# Patient Record
Sex: MALE | Race: WHITE | NOT HISPANIC OR LATINO | URBAN - METROPOLITAN AREA
[De-identification: names, ages, dates, MRNs, and addresses within clinical notes are randomized per-mention and may not be internally consistent; named-entity substitution may affect disease eponyms.]

---

## 2017-08-02 ENCOUNTER — OUTPATIENT (OUTPATIENT)
Dept: OUTPATIENT SERVICES | Facility: HOSPITAL | Age: 82
LOS: 1 days | End: 2017-08-02
Payer: MEDICARE

## 2017-08-02 DIAGNOSIS — Z51.89 ENCOUNTER FOR OTHER SPECIFIED AFTERCARE: ICD-10-CM

## 2017-08-02 DIAGNOSIS — R26.1 PARALYTIC GAIT: ICD-10-CM

## 2017-08-02 DIAGNOSIS — R47.01 APHASIA: ICD-10-CM

## 2017-08-02 DIAGNOSIS — I63.9 CEREBRAL INFARCTION, UNSPECIFIED: ICD-10-CM

## 2017-09-05 ENCOUNTER — APPOINTMENT (OUTPATIENT)
Dept: NEUROLOGY | Facility: CLINIC | Age: 82
End: 2017-09-05
Payer: MEDICARE

## 2017-09-05 VITALS
DIASTOLIC BLOOD PRESSURE: 93 MMHG | HEART RATE: 70 BPM | SYSTOLIC BLOOD PRESSURE: 169 MMHG | HEIGHT: 66 IN | WEIGHT: 129 LBS | BODY MASS INDEX: 20.73 KG/M2

## 2017-09-05 DIAGNOSIS — I10 ESSENTIAL (PRIMARY) HYPERTENSION: ICD-10-CM

## 2017-09-05 DIAGNOSIS — Z87.891 PERSONAL HISTORY OF NICOTINE DEPENDENCE: ICD-10-CM

## 2017-09-05 PROCEDURE — 99215 OFFICE O/P EST HI 40 MIN: CPT

## 2017-09-05 RX ORDER — CLOPIDOGREL 75 MG/1
75 TABLET, FILM COATED ORAL
Refills: 0 | Status: ACTIVE | COMMUNITY

## 2017-09-05 RX ORDER — ERGOCALCIFEROL 1.25 MG/1
1.25 MG CAPSULE, LIQUID FILLED ORAL
Refills: 0 | Status: ACTIVE | COMMUNITY

## 2017-09-05 RX ORDER — ASCORBIC ACID 125 MG
TABLET,CHEWABLE ORAL
Refills: 0 | Status: ACTIVE | COMMUNITY

## 2017-09-05 RX ORDER — LOSARTAN POTASSIUM 100 MG/1
TABLET, FILM COATED ORAL
Refills: 0 | Status: ACTIVE | COMMUNITY

## 2017-11-15 PROCEDURE — 97750 PHYSICAL PERFORMANCE TEST: CPT

## 2017-11-15 PROCEDURE — G8978: CPT | Mod: CM

## 2017-11-15 PROCEDURE — 92507 TX SP LANG VOICE COMM INDIV: CPT

## 2017-11-15 PROCEDURE — 97163 PT EVAL HIGH COMPLEX 45 MIN: CPT

## 2017-11-15 PROCEDURE — G8983: CPT | Mod: CL

## 2017-11-15 PROCEDURE — G9163: CPT | Mod: CK

## 2017-11-15 PROCEDURE — G8980: CPT | Mod: CL

## 2017-11-15 PROCEDURE — G8979: CPT | Mod: CL

## 2017-11-15 PROCEDURE — 97530 THERAPEUTIC ACTIVITIES: CPT

## 2017-11-15 PROCEDURE — G8981: CPT | Mod: CL

## 2017-11-15 PROCEDURE — 97535 SELF CARE MNGMENT TRAINING: CPT

## 2017-11-15 PROCEDURE — G9164: CPT | Mod: CL

## 2017-11-15 PROCEDURE — G8982: CPT | Mod: CL

## 2017-11-15 PROCEDURE — 97110 THERAPEUTIC EXERCISES: CPT

## 2017-11-15 PROCEDURE — 97112 NEUROMUSCULAR REEDUCATION: CPT

## 2017-11-15 PROCEDURE — 97167 OT EVAL HIGH COMPLEX 60 MIN: CPT

## 2017-11-15 PROCEDURE — G9162: CPT | Mod: CL

## 2017-11-15 PROCEDURE — 97116 GAIT TRAINING THERAPY: CPT | Mod: KX

## 2017-11-15 PROCEDURE — 92523 SPEECH SOUND LANG COMPREHEN: CPT

## 2017-12-27 ENCOUNTER — APPOINTMENT (OUTPATIENT)
Dept: NEUROLOGY | Facility: CLINIC | Age: 82
End: 2017-12-27
Payer: MEDICARE

## 2017-12-27 VITALS — DIASTOLIC BLOOD PRESSURE: 62 MMHG | SYSTOLIC BLOOD PRESSURE: 104 MMHG | HEIGHT: 66 IN

## 2017-12-27 PROCEDURE — 99215 OFFICE O/P EST HI 40 MIN: CPT

## 2018-04-04 ENCOUNTER — APPOINTMENT (OUTPATIENT)
Dept: NEUROLOGY | Facility: CLINIC | Age: 83
End: 2018-04-04
Payer: MEDICARE

## 2018-04-04 VITALS
WEIGHT: 129 LBS | SYSTOLIC BLOOD PRESSURE: 140 MMHG | BODY MASS INDEX: 20.73 KG/M2 | DIASTOLIC BLOOD PRESSURE: 68 MMHG | HEIGHT: 66 IN

## 2018-04-04 PROCEDURE — 99214 OFFICE O/P EST MOD 30 MIN: CPT

## 2018-08-30 ENCOUNTER — APPOINTMENT (OUTPATIENT)
Dept: NEUROLOGY | Facility: CLINIC | Age: 83
End: 2018-08-30
Payer: MEDICARE

## 2018-08-30 VITALS
HEIGHT: 66 IN | DIASTOLIC BLOOD PRESSURE: 90 MMHG | SYSTOLIC BLOOD PRESSURE: 190 MMHG | BODY MASS INDEX: 20.89 KG/M2 | WEIGHT: 130 LBS

## 2018-08-30 DIAGNOSIS — I63.9 CEREBRAL INFARCTION, UNSPECIFIED: ICD-10-CM

## 2018-08-30 DIAGNOSIS — I25.10 ATHEROSCLEROTIC HEART DISEASE OF NATIVE CORONARY ARTERY W/OUT ANGINA PECTORIS: ICD-10-CM

## 2018-08-30 PROCEDURE — 99214 OFFICE O/P EST MOD 30 MIN: CPT

## 2019-04-29 ENCOUNTER — INPATIENT (INPATIENT)
Facility: HOSPITAL | Age: 84
LOS: 1 days | Discharge: HOME | DRG: 299 | End: 2019-05-01
Attending: HOSPITALIST | Admitting: HOSPITALIST
Payer: COMMERCIAL

## 2019-04-29 VITALS
OXYGEN SATURATION: 99 % | WEIGHT: 130.07 LBS | HEART RATE: 96 BPM | HEIGHT: 69 IN | TEMPERATURE: 98 F | SYSTOLIC BLOOD PRESSURE: 131 MMHG | DIASTOLIC BLOOD PRESSURE: 72 MMHG | RESPIRATION RATE: 15 BRPM

## 2019-04-29 LAB
APTT BLD: 32.6 SEC — SIGNIFICANT CHANGE UP (ref 27.5–36.3)
BASOPHILS # BLD AUTO: 0 K/UL — SIGNIFICANT CHANGE UP (ref 0–0.2)
BASOPHILS NFR BLD AUTO: 0.3 % — SIGNIFICANT CHANGE UP (ref 0–2)
EOSINOPHIL # BLD AUTO: 0.1 K/UL — SIGNIFICANT CHANGE UP (ref 0–0.5)
EOSINOPHIL NFR BLD AUTO: 1.5 % — SIGNIFICANT CHANGE UP (ref 0–5)
HCT VFR BLD CALC: 31.9 % — LOW (ref 42–52)
HGB BLD-MCNC: 10.3 G/DL — LOW (ref 14–18)
INR BLD: 1.16 RATIO — SIGNIFICANT CHANGE UP (ref 0.88–1.16)
LACTATE BLDV-MCNC: 1.8 MMOL/L — SIGNIFICANT CHANGE UP (ref 0.5–2)
LYMPHOCYTES # BLD AUTO: 1.1 K/UL — SIGNIFICANT CHANGE UP (ref 1–4.8)
LYMPHOCYTES # BLD AUTO: 16.9 % — LOW (ref 20–55)
MCHC RBC-ENTMCNC: 29.3 PG — SIGNIFICANT CHANGE UP (ref 27–31)
MCHC RBC-ENTMCNC: 32.3 G/DL — SIGNIFICANT CHANGE UP (ref 32–36)
MCV RBC AUTO: 90.9 FL — SIGNIFICANT CHANGE UP (ref 80–94)
MONOCYTES # BLD AUTO: 0.6 K/UL — SIGNIFICANT CHANGE UP (ref 0–0.8)
MONOCYTES NFR BLD AUTO: 9.1 % — SIGNIFICANT CHANGE UP (ref 3–10)
NEUTROPHILS # BLD AUTO: 4.8 K/UL — SIGNIFICANT CHANGE UP (ref 1.8–8)
NEUTROPHILS NFR BLD AUTO: 72.2 % — SIGNIFICANT CHANGE UP (ref 37–73)
PLATELET # BLD AUTO: 109 K/UL — LOW (ref 150–400)
PROTHROM AB SERPL-ACNC: 13.4 SEC — HIGH (ref 10–12.9)
RBC # BLD: 3.51 M/UL — LOW (ref 4.6–6.2)
RBC # FLD: 17.4 % — HIGH (ref 11–15.6)
WBC # BLD: 6.7 K/UL — SIGNIFICANT CHANGE UP (ref 4.8–10.8)
WBC # FLD AUTO: 6.7 K/UL — SIGNIFICANT CHANGE UP (ref 4.8–10.8)

## 2019-04-29 PROCEDURE — 73630 X-RAY EXAM OF FOOT: CPT | Mod: 26,RT

## 2019-04-29 PROCEDURE — 99285 EMERGENCY DEPT VISIT HI MDM: CPT

## 2019-04-29 RX ORDER — SODIUM CHLORIDE 9 MG/ML
3 INJECTION INTRAMUSCULAR; INTRAVENOUS; SUBCUTANEOUS ONCE
Qty: 0 | Refills: 0 | Status: COMPLETED | OUTPATIENT
Start: 2019-04-29 | End: 2019-04-29

## 2019-04-29 RX ORDER — SODIUM CHLORIDE 9 MG/ML
1000 INJECTION INTRAMUSCULAR; INTRAVENOUS; SUBCUTANEOUS ONCE
Qty: 0 | Refills: 0 | Status: COMPLETED | OUTPATIENT
Start: 2019-04-29 | End: 2019-04-29

## 2019-04-29 RX ADMIN — SODIUM CHLORIDE 3 MILLILITER(S): 9 INJECTION INTRAMUSCULAR; INTRAVENOUS; SUBCUTANEOUS at 23:38

## 2019-04-29 RX ADMIN — SODIUM CHLORIDE 1000 MILLILITER(S): 9 INJECTION INTRAMUSCULAR; INTRAVENOUS; SUBCUTANEOUS at 23:43

## 2019-04-29 NOTE — ED ADULT TRIAGE NOTE - CHIEF COMPLAINT QUOTE
Patient awake, negative obvious distress or discomfort, mostly nonverbal as per daughter at bedside. Daughter stated called 911 because patient is being treated for gangrene to right foot, 4th toe & "needs to be checked out."

## 2019-04-29 NOTE — ED PROVIDER NOTE - NS ED ROS FT
Review of Systems  •	CONSTITUTIONAL - no  fever, no diaphoresis, no weight change  •	SKIN - no rash  •	HEMATOLOGIC - no bleeding, no bruising  •	EYES - no eye pain, no blurred vision  •	ENT - no change in hearing, no pain  •	RESPIRATORY - no shortness of breath, no cough  •	CARDIAC - no chest pain, no palpitations  •	GI - no abd pain, no nausea, no vomiting, no diarrhea, no constipation, no bleeding  •	GENITO-URINARY - no discharge, no dysuria; no hematuria,   •	ENDO - no polydypsia, no polyurea, no heat/no cold intolerance  •	MUSCULOSKELETAL -  (+) joint pain, no swelling, no redness  (+) black toe   •	NEUROLOGIC - no weakness, no headache, no anesthesia, no paresthesias  •	PSYCH - no anxiety, non suicidal, non homicidal, no hallucination, no depression

## 2019-04-29 NOTE — ED PROVIDER NOTE - OBJECTIVE STATEMENT
88 yo M hx of CAD s/p CABG, carotid endarectomy, HTN, PVD, CVA with aphagia and contracted limbs, pressure ulcer brought in by family for black right 4th toe worse since yesterday. Patient follow up with wound care at [facility].  He has seen vascular surgeon Dr. Farooq for PVD and was not a candidate for surgery. He has been seen by podiatrist. No fever, no cough

## 2019-04-29 NOTE — ED PROVIDER NOTE - PROGRESS NOTE DETAILS
blood work wnl. xray of foot showed no fracture and no obvious gas. I spoke to Dr. Bradford for admission. I spoke to podiatry, will come to see the patient in the morning.

## 2019-04-29 NOTE — ED PROVIDER NOTE - CLINICAL SUMMARY MEDICAL DECISION MAKING FREE TEXT BOX
86 yo M with multiple co-morbilities, cva speaks with few words sentences, PVD, HTN p/w gangranous right 4th toe. Blood work wnl. non-toxic at this point. Will need to be admitted to r/o osteomyelitis and podiatry consult for further management. Vanc and Zosyn ordered.

## 2019-04-29 NOTE — ED PROVIDER NOTE - PHYSICAL EXAMINATION
VITAL SIGNS: I have reviewed nursing notes and confirm.  CONSTITUTIONAL:  (+) cachectic   SKIN: Skin exam is warm and dry, no acute rash.  HEAD: Normocephalic; atraumatic.  EYES: PERRL, EOM intact; conjunctiva and sclera clear.  ENT: No nasal discharge; airway clear. Throat clear.  NECK: Supple; non tender.    CARD: S1, S2 normal; no murmurs, gallops, or rubs. Regular rate and rhythm.  RESP: No wheezes,  no rales or rhonchi.   ABD:  soft; non-distended; non-tender;   EXT: Normal ROM. No clubbing, cyanosis or edema.  NEURO: Alert, oriented. Grossly unremarkable. No focal deficits. no facial droop, moves all extremities,  no pronator drift, finger-to-nose wnl, normal gait   PSYCH: Cooperative, appropriate. VITAL SIGNS: I have reviewed nursing notes and confirm.  CONSTITUTIONAL:  (+) cachectic (+)contracted   SKIN: Skin exam is warm and dry, no acute rash. (+) pale   HEAD: Normocephalic; atraumatic.  EYES: PERRL, EOM intact; conjunctiva and sclera clear.  ENT: No nasal discharge; airway clear. Throat clear.  NECK: Supple; non tender.    CARD: S1, S2 normal; no murmurs, gallops, or rubs. Regular rate and rhythm.  RESP: No wheezes,  no rales or rhonchi.   ABD:  soft; non-distended; non-tender;   EXT: (+) right 4th toe black, wet, with surrounding erythema. decreased DP pulse. (-) crepitus.   NEURO: Alert, (+) speak few words.(+) contracted limb (+)  Moves all extremities.   PSYCH: Cooperative, appropriate.

## 2019-04-30 DIAGNOSIS — I96 GANGRENE, NOT ELSEWHERE CLASSIFIED: ICD-10-CM

## 2019-04-30 LAB
ALBUMIN SERPL ELPH-MCNC: 3.6 G/DL — SIGNIFICANT CHANGE UP (ref 3.3–5.2)
ALP SERPL-CCNC: 47 U/L — SIGNIFICANT CHANGE UP (ref 40–120)
ALT FLD-CCNC: 20 U/L — SIGNIFICANT CHANGE UP
ANION GAP SERPL CALC-SCNC: 16 MMOL/L — SIGNIFICANT CHANGE UP (ref 5–17)
ANION GAP SERPL CALC-SCNC: 16 MMOL/L — SIGNIFICANT CHANGE UP (ref 5–17)
AST SERPL-CCNC: 23 U/L — SIGNIFICANT CHANGE UP
BILIRUB SERPL-MCNC: 0.4 MG/DL — SIGNIFICANT CHANGE UP (ref 0.4–2)
BLD GP AB SCN SERPL QL: SIGNIFICANT CHANGE UP
BUN SERPL-MCNC: 76 MG/DL — HIGH (ref 8–20)
BUN SERPL-MCNC: 79 MG/DL — HIGH (ref 8–20)
CALCIUM SERPL-MCNC: 9.4 MG/DL — SIGNIFICANT CHANGE UP (ref 8.6–10.2)
CALCIUM SERPL-MCNC: 9.9 MG/DL — SIGNIFICANT CHANGE UP (ref 8.6–10.2)
CHLORIDE SERPL-SCNC: 102 MMOL/L — SIGNIFICANT CHANGE UP (ref 98–107)
CHLORIDE SERPL-SCNC: 98 MMOL/L — SIGNIFICANT CHANGE UP (ref 98–107)
CO2 SERPL-SCNC: 23 MMOL/L — SIGNIFICANT CHANGE UP (ref 22–29)
CO2 SERPL-SCNC: 26 MMOL/L — SIGNIFICANT CHANGE UP (ref 22–29)
CREAT SERPL-MCNC: 2.34 MG/DL — HIGH (ref 0.5–1.3)
CREAT SERPL-MCNC: 2.73 MG/DL — HIGH (ref 0.5–1.3)
CRP SERPL-MCNC: 5.97 MG/DL — HIGH (ref 0–0.4)
ERYTHROCYTE [SEDIMENTATION RATE] IN BLOOD: 35 MM/HR — HIGH (ref 0–20)
GLUCOSE SERPL-MCNC: 86 MG/DL — SIGNIFICANT CHANGE UP (ref 70–115)
GLUCOSE SERPL-MCNC: 92 MG/DL — SIGNIFICANT CHANGE UP (ref 70–115)
POTASSIUM SERPL-MCNC: 4.8 MMOL/L — SIGNIFICANT CHANGE UP (ref 3.5–5.3)
POTASSIUM SERPL-MCNC: 5.3 MMOL/L — SIGNIFICANT CHANGE UP (ref 3.5–5.3)
POTASSIUM SERPL-SCNC: 4.8 MMOL/L — SIGNIFICANT CHANGE UP (ref 3.5–5.3)
POTASSIUM SERPL-SCNC: 5.3 MMOL/L — SIGNIFICANT CHANGE UP (ref 3.5–5.3)
PROT SERPL-MCNC: 6.3 G/DL — LOW (ref 6.6–8.7)
SODIUM SERPL-SCNC: 140 MMOL/L — SIGNIFICANT CHANGE UP (ref 135–145)
SODIUM SERPL-SCNC: 141 MMOL/L — SIGNIFICANT CHANGE UP (ref 135–145)
TYPE + AB SCN PNL BLD: SIGNIFICANT CHANGE UP

## 2019-04-30 PROCEDURE — 99497 ADVNCD CARE PLAN 30 MIN: CPT | Mod: 25

## 2019-04-30 PROCEDURE — 99222 1ST HOSP IP/OBS MODERATE 55: CPT

## 2019-04-30 PROCEDURE — 99223 1ST HOSP IP/OBS HIGH 75: CPT

## 2019-04-30 PROCEDURE — 12345: CPT | Mod: NC,GC

## 2019-04-30 RX ORDER — AMITRIPTYLINE HCL 25 MG
10 TABLET ORAL AT BEDTIME
Qty: 0 | Refills: 0 | Status: DISCONTINUED | OUTPATIENT
Start: 2019-04-30 | End: 2019-05-01

## 2019-04-30 RX ORDER — ACETAMINOPHEN 500 MG
1000 TABLET ORAL ONCE
Qty: 0 | Refills: 0 | Status: COMPLETED | OUTPATIENT
Start: 2019-04-30 | End: 2019-04-30

## 2019-04-30 RX ORDER — SODIUM CHLORIDE 9 MG/ML
1000 INJECTION INTRAMUSCULAR; INTRAVENOUS; SUBCUTANEOUS
Qty: 0 | Refills: 0 | Status: DISCONTINUED | OUTPATIENT
Start: 2019-04-30 | End: 2019-05-01

## 2019-04-30 RX ORDER — AMLODIPINE BESYLATE 2.5 MG/1
5 TABLET ORAL DAILY
Qty: 0 | Refills: 0 | Status: DISCONTINUED | OUTPATIENT
Start: 2019-04-30 | End: 2019-05-01

## 2019-04-30 RX ORDER — VANCOMYCIN HCL 1 G
1000 VIAL (EA) INTRAVENOUS ONCE
Qty: 0 | Refills: 0 | Status: COMPLETED | OUTPATIENT
Start: 2019-04-30 | End: 2019-04-30

## 2019-04-30 RX ORDER — LIDOCAINE 4 G/100G
1 CREAM TOPICAL DAILY
Qty: 0 | Refills: 0 | Status: DISCONTINUED | OUTPATIENT
Start: 2019-04-30 | End: 2019-05-01

## 2019-04-30 RX ORDER — ACETAMINOPHEN 500 MG
650 TABLET ORAL EVERY 6 HOURS
Qty: 0 | Refills: 0 | Status: DISCONTINUED | OUTPATIENT
Start: 2019-04-30 | End: 2019-05-01

## 2019-04-30 RX ORDER — PIPERACILLIN AND TAZOBACTAM 4; .5 G/20ML; G/20ML
3.38 INJECTION, POWDER, LYOPHILIZED, FOR SOLUTION INTRAVENOUS EVERY 12 HOURS
Qty: 0 | Refills: 0 | Status: DISCONTINUED | OUTPATIENT
Start: 2019-04-30 | End: 2019-04-30

## 2019-04-30 RX ORDER — PIPERACILLIN AND TAZOBACTAM 4; .5 G/20ML; G/20ML
3.38 INJECTION, POWDER, LYOPHILIZED, FOR SOLUTION INTRAVENOUS ONCE
Qty: 0 | Refills: 0 | Status: COMPLETED | OUTPATIENT
Start: 2019-04-30 | End: 2019-04-30

## 2019-04-30 RX ORDER — HEPARIN SODIUM 5000 [USP'U]/ML
5000 INJECTION INTRAVENOUS; SUBCUTANEOUS EVERY 8 HOURS
Qty: 0 | Refills: 0 | Status: DISCONTINUED | OUTPATIENT
Start: 2019-04-30 | End: 2019-05-01

## 2019-04-30 RX ORDER — CADEXOMER IODINE 0.9 %
1 PADS, MEDICATED (EA) TOPICAL DAILY
Qty: 0 | Refills: 0 | Status: DISCONTINUED | OUTPATIENT
Start: 2019-04-30 | End: 2019-05-01

## 2019-04-30 RX ORDER — LOSARTAN POTASSIUM 100 MG/1
25 TABLET, FILM COATED ORAL DAILY
Qty: 0 | Refills: 0 | Status: DISCONTINUED | OUTPATIENT
Start: 2019-04-30 | End: 2019-04-30

## 2019-04-30 RX ORDER — FENTANYL CITRATE 50 UG/ML
1 INJECTION INTRAVENOUS
Qty: 0 | Refills: 0 | Status: DISCONTINUED | OUTPATIENT
Start: 2019-04-30 | End: 2019-04-30

## 2019-04-30 RX ORDER — POVIDONE-IODINE 5 %
1 AEROSOL (ML) TOPICAL DAILY
Qty: 0 | Refills: 0 | Status: DISCONTINUED | OUTPATIENT
Start: 2019-04-30 | End: 2019-05-01

## 2019-04-30 RX ORDER — OXYCODONE AND ACETAMINOPHEN 5; 325 MG/1; MG/1
2 TABLET ORAL EVERY 6 HOURS
Qty: 0 | Refills: 0 | Status: DISCONTINUED | OUTPATIENT
Start: 2019-04-30 | End: 2019-04-30

## 2019-04-30 RX ORDER — SACCHAROMYCES BOULARDII 250 MG
250 POWDER IN PACKET (EA) ORAL
Qty: 0 | Refills: 0 | Status: DISCONTINUED | OUTPATIENT
Start: 2019-04-30 | End: 2019-05-01

## 2019-04-30 RX ORDER — OXYCODONE AND ACETAMINOPHEN 5; 325 MG/1; MG/1
1 TABLET ORAL EVERY 6 HOURS
Qty: 0 | Refills: 0 | Status: DISCONTINUED | OUTPATIENT
Start: 2019-04-30 | End: 2019-04-30

## 2019-04-30 RX ADMIN — SODIUM CHLORIDE 1000 MILLILITER(S): 9 INJECTION INTRAMUSCULAR; INTRAVENOUS; SUBCUTANEOUS at 01:01

## 2019-04-30 RX ADMIN — Medication 1 APPLICATION(S): at 22:56

## 2019-04-30 RX ADMIN — LIDOCAINE 1 PATCH: 4 CREAM TOPICAL at 19:00

## 2019-04-30 RX ADMIN — Medication 400 MILLIGRAM(S): at 17:26

## 2019-04-30 RX ADMIN — Medication 250 MILLIGRAM(S): at 04:58

## 2019-04-30 RX ADMIN — PIPERACILLIN AND TAZOBACTAM 25 GRAM(S): 4; .5 INJECTION, POWDER, LYOPHILIZED, FOR SOLUTION INTRAVENOUS at 12:28

## 2019-04-30 RX ADMIN — Medication 250 MILLIGRAM(S): at 17:35

## 2019-04-30 RX ADMIN — AMLODIPINE BESYLATE 5 MILLIGRAM(S): 2.5 TABLET ORAL at 12:27

## 2019-04-30 RX ADMIN — HEPARIN SODIUM 5000 UNIT(S): 5000 INJECTION INTRAVENOUS; SUBCUTANEOUS at 12:28

## 2019-04-30 RX ADMIN — LOSARTAN POTASSIUM 25 MILLIGRAM(S): 100 TABLET, FILM COATED ORAL at 04:58

## 2019-04-30 RX ADMIN — Medication 650 MILLIGRAM(S): at 22:56

## 2019-04-30 RX ADMIN — Medication 650 MILLIGRAM(S): at 17:34

## 2019-04-30 RX ADMIN — LIDOCAINE 1 PATCH: 4 CREAM TOPICAL at 17:28

## 2019-04-30 RX ADMIN — Medication 650 MILLIGRAM(S): at 17:50

## 2019-04-30 RX ADMIN — Medication 1000 MILLIGRAM(S): at 17:56

## 2019-04-30 RX ADMIN — PIPERACILLIN AND TAZOBACTAM 200 GRAM(S): 4; .5 INJECTION, POWDER, LYOPHILIZED, FOR SOLUTION INTRAVENOUS at 02:22

## 2019-04-30 RX ADMIN — SODIUM CHLORIDE 84 MILLILITER(S): 9 INJECTION INTRAMUSCULAR; INTRAVENOUS; SUBCUTANEOUS at 20:27

## 2019-04-30 NOTE — CONSULT NOTE ADULT - SUBJECTIVE AND OBJECTIVE BOX
HPI: This is an 86yo M PMH of CAD, HTN, PVD and CVA with residual aphasia and Right sided contracted limbs.   He was admitted to hospital with rapidly extension of R fourth toe gangrene. Patient admits he is in pain, exhibiting pain behaviors.  He was awake earlier at lunchtime, was fed by Private , good appetite, no difficulty swallowing. He is afebrile-ROS not possible at time of my evaluation.    86 y/o male with PMH of CAD s/p CABG, HTN, PVD, CVA with aphasia and contracted limbs was brought to the ED by daughter complaining of black right 4th toe. As per daughter, she stated noticing black dot on the side of the 4th toe 1 week ago but 2 days ago she noticed the entire toe has turned black. Patient follows with vascular surgery; had an AYLA done recently which reveal b/l superficial femoral and distal trification disease (Daughter brought the result with her). As per daughter, vascular has not intention of any intervention because of his comorbidities. No fever, chills, chest pain, shortness of breath, nausea/vomiting, abdominal pain, change in bowel/urinary habit reported. (30 Apr 2019 00:56)      PERTINENT PMH REVIEWED: Yes     PAST MEDICAL & SURGICAL HISTORY:  CVA (cerebral vascular accident)  PVD (peripheral vascular disease)  Hypertension, unspecified type  CAD (coronary artery disease): CVA  No significant past surgical history      SOCIAL HISTORY:  EtOH   No                                    Drugs   No                                      nonsmoker                                    Admitted from: home  Daughter : Katelynn Catalan [phone]______    FAMILY HISTORY:  Family history of breast cancer in mother    No Known Allergies    Baseline ADLs (prior to admission):  / Dependent      Present Symptoms:     Dyspnea: 0   Nausea/Vomiting:  No  Anxiety:  Yes  Depression: No  Fatigue: Yes   Loss of appetite: No    Pain: RLE and R foot and toe            Character-            Duration-            Effect-            Factors-            Frequency-            Location-contractured            Severity-    Review of Systems: Reviewed                                     Unable to obtain due to poor mentation       MEDICATIONS  (STANDING):  acetaminophen   Tablet .. 650 milliGRAM(s) Oral every 6 hours  acetaminophen  IVPB .. 1000 milliGRAM(s) IV Intermittent once  amitriptyline 10 milliGRAM(s) Oral at bedtime  amLODIPine   Tablet 5 milliGRAM(s) Oral daily  fentaNYL   Patch  12 MICROgram(s)/Hr 1 Patch Transdermal every 72 hours  heparin  Injectable 5000 Unit(s) SubCutaneous every 8 hours  lidocaine   Patch 1 Patch Transdermal daily  povidone iodine 10% Solution 1 Application(s) Topical daily  saccharomyces boulardii 250 milliGRAM(s) Oral two times a day  sodium chloride 0.9%. 1000 milliLiter(s) (84 mL/Hr) IV Continuous <Continuous>    MEDICATIONS  (PRN):  acetaminophen   Tablet .. 650 milliGRAM(s) Oral every 6 hours PRN Temp greater or equal to 38C (100.4F), Mild Pain (1 - 3), Moderate Pain (4 - 6)  oxyCODONE    5 mG/acetaminophen 325 mG 1 Tablet(s) Oral every 6 hours PRN Moderate Pain (4 - 6)  oxyCODONE    5 mG/acetaminophen 325 mG 2 Tablet(s) Oral every 6 hours PRN Severe Pain (7 - 10)      PHYSICAL EXAM:    Vital Signs Last 24 Hrs  T(C): 36.9 (30 Apr 2019 02:42), Max: 36.9 (30 Apr 2019 02:42)  T(F): 98.4 (30 Apr 2019 02:42), Max: 98.4 (30 Apr 2019 02:42)  HR: 54 (30 Apr 2019 07:45) (54 - 126)  BP: 137/67 (30 Apr 2019 07:45) (131/72 - 158/75)  BP(mean): --  RR: 18 (30 Apr 2019 02:42) (15 - 18)  SpO2: 95% (30 Apr 2019 02:42) (94% - 99%)    General: alert  oriented to self x ____ sleepy                  cachexia  nonverbal aphasia smiles- cognition seems good      HEENT: normal  dry mouth      Lungs: comfortable    CV: normal      GI: normal  distended                constipation  last BM:     : normal  incontinent      MSK:  weakness            bedbound/wheelchair bound    Skin: normal  _  no rash    LABS:                        10.3   6.7   )-----------( 109      ( 29 Apr 2019 23:37 )             31.9     04-30    141  |  102  |  76.0<H>  ----------------------------<  92  4.8   |  23.0  |  2.34<H>    Ca    9.4      30 Apr 2019 07:09    TPro  6.3<L>  /  Alb  3.6  /  TBili  0.4  /  DBili  x   /  AST  23  /  ALT  20  /  AlkPhos  47  04-29    PT/INR - ( 29 Apr 2019 23:37 )   PT: 13.4 sec;   INR: 1.16 ratio         PTT - ( 29 Apr 2019 23:37 )  PTT:32.6 sec    I&O's Summary    29 Apr 2019 07:01  -  30 Apr 2019 07:00  --------------------------------------------------------  IN: 250 mL / OUT: 0 mL / NET: 250 mL    RADIOLOGY & ADDITIONAL STUDIES:  < from: Xray Foot AP + Lateral + Oblique, Right (04.29.19 @ 22:52) >    FINDINGS:  Vascular arterial calcifications noted.  The bones and joint spaces are preserved.  There are no fractures or dislocations.  The soft tissues are normal.     IMPRESSION:    No acute radiographic osseous pathology.  If pain persist despite conservative therapy and soft tissue internal   derangement or occult fracture is clinically suspected follow-up MRI   recommended.        ADVANCE DIRECTIVES:   DNR NO  Completed on:                     MOLST  NO   Completed on:  Living Will  NO   Completed on: HPI: This is an 86yo M PMH of CAD, HTN, PVD and CVA with residual aphasia with  Right sided contracted limbs over past few months.   He was admitted to hospital with rapidly extension of R fourth toe gangrene. Patient admits he is in pain, exhibiting pain behaviors.  He was awake earlier at lunchtime, was fed by Private , good appetite, no difficulty swallowing. He is afebrile-ROS not possible at time of my evaluation.    86 y/o male with PMH of CAD s/p CABG, HTN, PVD, CVA with aphasia and contracted limbs was brought to the ED by daughter complaining of black right 4th toe. As per daughter, she stated noticing black dot on the side of the 4th toe 1 week ago but 2 days ago she noticed the entire toe has turned black. Patient follows with vascular surgery; had an AYLA done recently which reveal b/l superficial femoral and distal trification disease (Daughter brought the result with her). As per daughter, vascular has not intention of any intervention because of his comorbidities. No fever, chills, chest pain, shortness of breath, nausea/vomiting, abdominal pain, change in bowel/urinary habit reported. (30 Apr 2019 00:56)      PERTINENT PMH REVIEWED: Yes     PAST MEDICAL & SURGICAL HISTORY:  CVA (cerebral vascular accident)  PVD (peripheral vascular disease)  Hypertension, unspecified type  CAD (coronary artery disease): CVA  No significant past surgical history      SOCIAL HISTORY:  EtOH   No                                    Drugs   No                                      nonsmoker                                    Admitted from: home  Daughter : Katelynn Catalan [phone]______    FAMILY HISTORY:  Family history of breast cancer in mother    No Known Allergies    Baseline ADLs (prior to admission):  / Dependent      Present Symptoms:     Dyspnea: 0   Nausea/Vomiting:  No  Anxiety:  Yes  Depression: No  Fatigue: Yes   Loss of appetite: No    Pain: RLE and R foot and toe            Character-            Duration-            Effect-            Factors-            Frequency-            Location-contractured            Severity-    Review of Systems: Reviewed                                     Unable to obtain due to poor mentation       MEDICATIONS  (STANDING):  acetaminophen   Tablet .. 650 milliGRAM(s) Oral every 6 hours  acetaminophen  IVPB .. 1000 milliGRAM(s) IV Intermittent once  amitriptyline 10 milliGRAM(s) Oral at bedtime  amLODIPine   Tablet 5 milliGRAM(s) Oral daily  fentaNYL   Patch  12 MICROgram(s)/Hr 1 Patch Transdermal every 72 hours  heparin  Injectable 5000 Unit(s) SubCutaneous every 8 hours  lidocaine   Patch 1 Patch Transdermal daily  povidone iodine 10% Solution 1 Application(s) Topical daily  saccharomyces boulardii 250 milliGRAM(s) Oral two times a day  sodium chloride 0.9%. 1000 milliLiter(s) (84 mL/Hr) IV Continuous <Continuous>    MEDICATIONS  (PRN):  acetaminophen   Tablet .. 650 milliGRAM(s) Oral every 6 hours PRN Temp greater or equal to 38C (100.4F), Mild Pain (1 - 3), Moderate Pain (4 - 6)  oxyCODONE    5 mG/acetaminophen 325 mG 1 Tablet(s) Oral every 6 hours PRN Moderate Pain (4 - 6)  oxyCODONE    5 mG/acetaminophen 325 mG 2 Tablet(s) Oral every 6 hours PRN Severe Pain (7 - 10)      PHYSICAL EXAM:    Vital Signs Last 24 Hrs  T(C): 36.9 (30 Apr 2019 02:42), Max: 36.9 (30 Apr 2019 02:42)  T(F): 98.4 (30 Apr 2019 02:42), Max: 98.4 (30 Apr 2019 02:42)  HR: 54 (30 Apr 2019 07:45) (54 - 126)  BP: 137/67 (30 Apr 2019 07:45) (131/72 - 158/75)  BP(mean): --  RR: 18 (30 Apr 2019 02:42) (15 - 18)  SpO2: 95% (30 Apr 2019 02:42) (94% - 99%)    General: alert  oriented to self x ____ sleepy                  cachexia  nonverbal aphasia smiles- cognition seems good      HEENT: normal  dry mouth      Lungs: comfortable    CV: normal      GI: normal  distended                constipation  last BM:     : normal  incontinent      MSK:  weakness            bedbound/wheelchair bound    Skin: normal  _  no rash    LABS:                        10.3   6.7   )-----------( 109      ( 29 Apr 2019 23:37 )             31.9     04-30    141  |  102  |  76.0<H>  ----------------------------<  92  4.8   |  23.0  |  2.34<H>    Ca    9.4      30 Apr 2019 07:09    TPro  6.3<L>  /  Alb  3.6  /  TBili  0.4  /  DBili  x   /  AST  23  /  ALT  20  /  AlkPhos  47  04-29    PT/INR - ( 29 Apr 2019 23:37 )   PT: 13.4 sec;   INR: 1.16 ratio         PTT - ( 29 Apr 2019 23:37 )  PTT:32.6 sec    I&O's Summary    29 Apr 2019 07:01  -  30 Apr 2019 07:00  --------------------------------------------------------  IN: 250 mL / OUT: 0 mL / NET: 250 mL    RADIOLOGY & ADDITIONAL STUDIES:  < from: Xray Foot AP + Lateral + Oblique, Right (04.29.19 @ 22:52) >    FINDINGS:  Vascular arterial calcifications noted.  The bones and joint spaces are preserved.  There are no fractures or dislocations.  The soft tissues are normal.     IMPRESSION:    No acute radiographic osseous pathology.  If pain persist despite conservative therapy and soft tissue internal   derangement or occult fracture is clinically suspected follow-up MRI   recommended.        ADVANCE DIRECTIVES:   DNR NO  Completed on:                     MOLST  NO   Completed on:  Living Will  NO   Completed on:

## 2019-04-30 NOTE — ED ADULT NURSE NOTE - OBJECTIVE STATEMENT
pt sleeping in bed with daughter at bedside. pt is arousable to painful stimuli. pt is oriented to self. as per daughter t pt sleeping in bed with daughter at bedside. pt is arousable to painful stimuli. pt is oriented to self. as per daughter the pt has gangrene of the right third toe and was referred by the pt wound care nurse. upon exam pt third toe on the right foot is necrotic.

## 2019-04-30 NOTE — DIETITIAN INITIAL EVALUATION ADULT. - ETIOLOGY
related to inadequate protein-energy intake with increased needs in setting of gangrene toe, s/p CVA with dysphagia, advanced age

## 2019-04-30 NOTE — CHART NOTE - NSCHARTNOTEFT_GEN_A_CORE
Upon Nutritional Assessment by the Registered Dietitian your patient was determined to meet criteria / has evidence of the following diagnosis/diagnoses:          [x] Severe chronic Protein Calorie Malnutrition        [x] Underweight / BMI <19    Findings as based on:  •  Comprehensive nutrition assessment and consultation  •  Calorie counts (nutrient intake analysis)  •  Food acceptance and intake status from observations by staff  •  Follow up  •  Patient education  •  Intervention secondary to interdisciplinary rounds  •   concerns    Pt presents with malnutrition (severe chronic) related to inadequate protein-energy intake with increased needs in setting of gangrene toe, and multiple PI's (L ankle unstageable, Stage II sacrum, Stage II R. midback), s/p CVA with dysphagia, advanced age as evidenced by meeting <75% of estimated protein-energy needs > 1 month resulting in severe muscle wasting (temples, clavicles, shoulders, scapula) severe fat loss (buccal, orbital, thoracic region).     Treatment:    The following diet has been recommended:  1) Ensure Pudding TID  2) MVI and vit C 500mg daily to facilitate wound healing   3) Zinc sulfate 220mg/day x 10 days to facilitate wound healing   4) Continue with Ensure Enlive BID   5) RD to provide Magic Cup and Gelatein BID   6) Provide encouragement/assistance as needed during mealtimes to inc PO     PROVIDER Section:     By signing this assessment you are acknowledging and agree with the diagnosis/diagnoses assigned by the Registered Dietitian    Comments:

## 2019-04-30 NOTE — DIETITIAN INITIAL EVALUATION ADULT. - NS AS NUTRI INTERV VITAMIN2
C/Multivitamin/mineral/MVI, vit C 500mg, zinc sulfate 220mg/day x 10 days to facilitate wound healing

## 2019-04-30 NOTE — CONSULT NOTE ADULT - ASSESSMENT
This 87 y.o. M   with CAD s/p CABG, HTN, PVD, CVA with aphasia and contracted limbs     black right 4th toe.  no fevers    consistent with dry gangrene    likely vascular disease      - stop Zosyn  conservative Rx   - Betadine paint daily to affected to keep sanitize.     for left lateral malleolus ulcer, not infected.   - continue wound care.     monitor for fevers  call back PRN

## 2019-04-30 NOTE — CHART NOTE - NSCHARTNOTEFT_GEN_A_CORE
86 y/o male with PMH of CAD s/p CABG, HTN, PVD, CVA with aphasia and contracted limbs was brought to the ED by daughter complaining of black right 4th toe since 2-3 days. Podiatry, vascualar and ID consulted.    plan:  Discussed with Vascular  Dr Huey Arguelles and recommended podiatry for toe amputaion. Patient is high risk for surgical intervention.  ID and podiatry consulted  Palliative consulted for goals of care and pain management  will continue zosyn for now and pain management    CKD-4  Cr: 2.73 (no baseline)   As per daughter, patient has a known hx of CKD     HTN   Continue Losartan 25mg     CAD  As per daughter, patient was taken off Aspirin and Plavix due to increase risk of fall     CVA   Not on Aspirin     Supportive   DVT prophylaxis: CSD   Diet: mechanical soft with Ensure 86 y/o male with PMH of CAD s/p CABG, HTN, PVD, CVA with aphasia and contracted limbs was brought to the ED by daughter complaining of black right 4th toe since 2-3 days. Podiatry, vascualar and ID consulted.    plan:  Discussed with Vascular  Dr Huey Arguelles and recommended podiatry for toe amputaion. Patient is high risk for surgical intervention from vascular standpoint  ID and podiatry consulted  Palliative consulted for goals of care and pain management  will continue zosyn for now and pain management    CKD-4  Cr: 2.73 (no baseline)   As per daughter, patient has a known hx of CKD   IV hydration as he is sleepy and not eating and drinking  Nutrioninst consulted    HTN   Continue Losartan 25mg     CAD  As per daughter, patient was taken off Aspirin and Plavix due to increase risk of fall     CVA   Not on Aspirin     Supportive   DVT prophylaxis: CSD   Diet: mechanical soft with Ensure

## 2019-04-30 NOTE — DIETITIAN INITIAL EVALUATION ADULT. - OTHER INFO
Pt admitted with R. 4th toe gangrene, s/p CVA, hx of CKD 4, HTN, CAD. Per aide, Pt normally eats well though recent decline in PO intake x 1 month, unable to obtain weight hx. Pt takes homemade protein shake daily at home. Pt consumes soft textured foods at home, tolerating [facility] soft diet. Per aide, Pt dislikes vanilla Ensure, willing to try other flavors, agreeable to ensure pudding and incorporating other nutrient dense foods to meet sufficient protein-energy needs. RD to remain available.

## 2019-04-30 NOTE — ED ADULT NURSE NOTE - PMH
No pertinent past medical history <<----- Click to add NO pertinent Past Medical History CAD (coronary artery disease)  CVA  CVA (cerebral vascular accident)    Hypertension, unspecified type    PVD (peripheral vascular disease)

## 2019-04-30 NOTE — CONSULT NOTE ADULT - SUBJECTIVE AND OBJECTIVE BOX
S: 88 y/o male with PMH of CAD s/p CABG, HTN, PVD, CVA with aphasia and contracted limbs sent in by daughter complaining of his black right 4th toe. Pt is a poor historian. As per daughter, she stated noticing black dot on the side of the 4th toe 1 week ago but 2 days ago she noticed the entire toe has turned black. Patient follows with vascular surgery; had an AYLA done recently which reveal b/l superficial femoral and distal trifurcation disease (Daughter brought the result with her). As per daughter, vascular has not intention of any intervention because of his comorbidities. No fever, chills, chest pain, shortness of breath, nausea/vomiting, abdominal pain, change in bowel/urinary habit reported.    Patient admits to  (-) Fevers, (-) Chills, (-) Nausea, (-) Vomiting, (-) Shortness of Breath    PMH: CVA (cerebral vascular accident)  PVD (peripheral vascular disease)  Hypertension, unspecified type  CAD (coronary artery disease)  No pertinent past medical history    PSH: No significant past surgical history    Allergies: No Known Allergies    Labs:                          10.3   6.7   )-----------( 109      ( 29 Apr 2019 23:37 )             31.9     WBC Trend  6.7 Date (04-29 @ 23:37)      Chem  04-30    141  |  102  |  76.0<H>  ----------------------------<  92  4.8   |  23.0  |  2.34<H>    Ca    9.4      30 Apr 2019 07:09    TPro  6.3<L>  /  Alb  3.6  /  TBili  0.4  /  DBili  x   /  AST  23  /  ALT  20  /  AlkPhos  47  04-29    T(F): 98.4 (04-30-19 @ 02:42), Max: 98.4 (04-30-19 @ 02:42)  HR: 54 (04-30-19 @ 07:45) (54 - 126)  BP: 137/67 (04-30-19 @ 07:45) (131/72 - 158/75)  RR: 18 (04-30-19 @ 02:42) (15 - 18)  SpO2: 95% (04-30-19 @ 02:42) (94% - 99%)    O:   General: Pleasant  male NAD & AOX3.    Integument:  Skin warm, dry and supple bilateral.    Ulceration Left lateral malleolus with fibrogranular base, pressure related in nature, no malodor or probe to bone, measuring 2 x 1.5 x 0.1 cm  Right 4th digit dry and gangrene to the entire digit with no malodor  Vascular: Dorsalis Pedis and Posterior Tibial pulses 1/4.  Capillary re-fill time less then 3 seconds digits 1-5 bilateral.    Neuro: Protective sensation diminished to the level of the digits bilateral.  MSK: Contracted lower extremities    < from: Xray Foot AP + Lateral + Oblique, Right (04.29.19 @ 22:52) >   EXAM:  FOOT-RIGHT                          PROCEDURE DATE:  04/29/2019          INTERPRETATION:  RIGHT foot     CLINICAL INFORMATION:Injury with  Pain. Attention RIGHT fourth toe    TECHNIQUE: AP,lateral and oblique views.    FINDINGS:  Vascular arterial calcifications noted.  The bones and joint spaces are preserved.  There are no fractures or dislocations.  The soft tissues are normal.     IMPRESSION:    No acute radiographic osseous pathology.  If pain persist despite conservative therapy and soft tissue internal   derangement or occult fracture is clinically suspected follow-up MRI   recommended.    < end of copied text >    A: Right 4th digit gangrene and left lateral malleolus ulcer    P:   Chart reviewed and Patient evaluated  Discussed diagnosis and treatment with patient  Iodosorb to be ordered to be applied to the left lateral malleolus with an Allevyn pad  Right 4th digit to be kept dry with light application of betadine and DSD.  X-rays reviewed : Shows acute pathology.  Continue with IV antibiotics As Per ID  Outpatient AYLA reviewed. AYLA 0.4 - R, 0.6 - L.   Weight bearing as tolerated.  Consider surgical management versus conservative management with autoamputation d/t comorbidities  Vascular surgery note appreciated. States that patient is not a candidate for re-vascularization d/t non-ambulatory status, severe knee contracture and medical co-morbidities.  Offloading to bilateral Heels in bed with offloading boots.    Podiatry will follow while in house.  Discussed with Attending Gene.

## 2019-04-30 NOTE — DIETITIAN INITIAL EVALUATION ADULT. - PERTINENT LABORATORY DATA
04-30 Na141 mmol/L Glu 92 mg/dL K+ 4.8 mmol/L Cr  2.34 mg/dL<H> BUN 76.0 mg/dL<H> Phos n/a   Alb n/a   PAB n/a

## 2019-04-30 NOTE — CONSULT NOTE ADULT - SUBJECTIVE AND OBJECTIVE BOX
HPI:  88 y/o male with PMH of CAD s/p CABG, HTN, PVD, CVA with aphasia and contracted limbs was brought to the ED by daughter complaining of black right 4th toe. As per daughter, she stated noticing black dot on the side of the 4th toe 1 week ago but 2 days ago she noticed the entire toe has turned black. Patient follows with vascular surgery; had an AYLA done recently which reveal b/l superficial femoral and distal trification disease Pt is non ambulatory and has a severe right knee contracture. I have recently evaluated him in my office and had a long discussion at that time and explained to his daughter that he is not a candidate for re-vascularization given non-ambulatory astatus, severe knee contracture and medical co-morbidities. AYLA .4 - R, .6 - L.     PAST MEDICAL & SURGICAL HISTORY:  CVA (cerebral vascular accident)  PVD (peripheral vascular disease)  Hypertension, unspecified type  CAD (coronary artery disease): CVA  No significant past surgical history      REVIEW OF SYSTEMS:  Unattainable due to aphasia     Skin: R 5th toe gangrene     MEDICATIONS  (STANDING):  amLODIPine   Tablet 5 milliGRAM(s) Oral daily  heparin  Injectable 5000 Unit(s) SubCutaneous every 8 hours  piperacillin/tazobactam IVPB. 3.375 Gram(s) IV Intermittent every 12 hours  saccharomyces boulardii 250 milliGRAM(s) Oral two times a day    MEDICATIONS  (PRN):  acetaminophen   Tablet .. 650 milliGRAM(s) Oral every 6 hours PRN Temp greater or equal to 38C (100.4F), Mild Pain (1 - 3), Moderate Pain (4 - 6)  oxyCODONE    5 mG/acetaminophen 325 mG 1 Tablet(s) Oral every 6 hours PRN Moderate Pain (4 - 6)  oxyCODONE    5 mG/acetaminophen 325 mG 2 Tablet(s) Oral every 6 hours PRN Severe Pain (7 - 10)      Allergies    No Known Allergies    Intolerances        SOCIAL HISTORY:    Vital Signs Last 24 Hrs  T(C): 36.9 (30 Apr 2019 02:42), Max: 36.9 (30 Apr 2019 02:42)  T(F): 98.4 (30 Apr 2019 02:42), Max: 98.4 (30 Apr 2019 02:42)  HR: 54 (30 Apr 2019 07:45) (54 - 126)  BP: 137/67 (30 Apr 2019 07:45) (131/72 - 158/75)  BP(mean): --  RR: 18 (30 Apr 2019 02:42) (15 - 18)  SpO2: 95% (30 Apr 2019 02:42) (94% - 99%)    PHYSICAL EXAM:    Constitutional: Awake not communicating, doesn't follow my commands     Eyes: PERRL    Respiratory: CTAB    Cardiovascular: S1S2, RRR    Gastrointestinal: Soft, NT/ND, + BS    Extremities: Right 4th toe dry gangrene, min erythema, b/l knee contracture R > L     Vascular: 2+ femoral b/l, monophasic pedal signals     Neurological: Difficult to access, expressive aphasia      LABS:                        10.3   6.7   )-----------( 109      ( 29 Apr 2019 23:37 )             31.9     04-30    141  |  102  |  76.0<H>  ----------------------------<  92  4.8   |  23.0  |  2.34<H>    Ca    9.4      30 Apr 2019 07:09    TPro  6.3<L>  /  Alb  3.6  /  TBili  0.4  /  DBili  x   /  AST  23  /  ALT  20  /  AlkPhos  47  04-29    PT/INR - ( 29 Apr 2019 23:37 )   PT: 13.4 sec;   INR: 1.16 ratio         PTT - ( 29 Apr 2019 23:37 )  PTT:32.6 sec      RADIOLOGY & ADDITIONAL STUDIES

## 2019-04-30 NOTE — H&P ADULT - HISTORY OF PRESENT ILLNESS
86 yo M hx of CAD s/p CABG, carotid endarectomy, HTN, PVD, CVA with aphagia and contracted limbs, pressure ulcer brought in by family for black right 4th toe worse since yesterday. 88 y/o male with PMH of CAD s/p CABG, HTN, PVD, CVA with aphasia and contracted limbs was brought to the ED by daughter complaining of black right 4th toe. As per daughter, she stated noticing black dot on the side of the 4th toe 1 week ago but 2 days ago she noticed the entire toe has turned black. Patient follows with vascular surgery; had an AYLA done recently which reveal b/l superficial femoral and distal trification disease (Daughter brought the result with her). As per daughter, vascular has not intention of any intervention because of his comorbidities. No fever, chills, chest pain, shortness of breath, nausea/vomiting, abdominal pain, change in bowel/urinary habit reported.

## 2019-04-30 NOTE — CONSULT NOTE ADULT - ASSESSMENT
A/P 86 y/o male with PAD, right 4th toe dry gangrene    - not a candidate for revascularization as explained above  - Right 4th toe amputation as per podiatry and may need full TMA if amp site does not heal  - At risk for limb loss if above measures fail  - Please re-consult if above measures fail and family agreeable for R AKA  - B/L heel offloading  - Turn and position Q 2 hrs  - Plan discussed with Dr. Charles  - Discussed at length with the patient's daughter Katelynn

## 2019-04-30 NOTE — H&P ADULT - ASSESSMENT
Right toe gangrene   Admit to medical floor   XR foot and toe done follow up result   ESR, CRP in AM   Zosyn and Vancomycin once given in the ED   Will continue Zosyn for now   Podiatry consulted in the ED will see patient in AM             Supportive   DVT prophylaxis:   Diet: 88 y/o male with PMH of CAD s/p CABG, HTN, PVD, CVA with aphasia and contracted limbs was brought to the ED by daughter complaining of black right 4th toe.    Right toe gangrene   Admit to medical floor   XR foot and toe done follow up result   ESR, CRP in AM   Zosyn and Vancomycin once given in the ED   Will continue Zosyn for now   Podiatry consulted in the ED will see patient in AM   ID consult     CKD-4  Cr: 2.73 (no baseline)   As per daughter, patient has a known hx of CKD     HTN   Continue Losartan 25mg     CAD  As per daughter, patient was taken off Aspirin and Plavix due to increase risk of fall     CVA   Not on Aspirin     Supportive   DVT prophylaxis: CSD   Diet: mechanical soft with Ensure     20 minutes spent talking about advance directives with daughter who is the HCP. She expressed that she wants him to be DNR/DNI but does not want to sign the form right now, she also said in case patient decompensated she should be notified she lives 3 minutes away; she will come to the hospital. 88 y/o male with PMH of CAD s/p CABG, HTN, PVD, CVA with aphasia and contracted limbs was brought to the ED by daughter complaining of black right 4th toe.    Right toe gangrene   Admit to medical floor   XR foot and toe done follow up result   ESR, CRP in AM   Zosyn and Vancomycin once given in the ED   Will continue Zosyn for now   Podiatry consulted in the ED will see patient in AM   ID consult     CKD-4  Cr: 2.73 (no baseline)   As per daughter, patient has a known hx of CKD     HTN   Continue Losartan 25mg     CAD  As per daughter, patient was taken off Aspirin and Plavix due to increase risk of fall     CVA   Not on Aspirin     Supportive   DVT prophylaxis: CSD   Diet: mechanical soft with Ensure     20 minutes spent talking about advance directives with daughter who is the HCP. She expressed that she wants him to be DNR/DNI but does not want to sign the form right now, she also said in case patient decompensated she should be notified she lives 3 minutes away; she will come to the hospital.     LOS: 5-7days

## 2019-04-30 NOTE — CONSULT NOTE ADULT - SUBJECTIVE AND OBJECTIVE BOX
[facility]  INFECTIOUS DISEASES AND INTERNAL MEDICINE at [place]  =======================================================  Art Miller MD  Diplomates American Board of Internal Medicine and Infectious Diseases  Telephone [phone]  Fax            [phone]  =======================================================    [facility]-[id number]  JENNY CRUMP   HPI:  88 y/o male with PMH of CAD s/p CABG, HTN, PVD, CVA with aphasia and contracted limbs was brought to the ED by daughter complaining of black right 4th toe. As per daughter, she stated noticing black dot on the side of the 4th toe 1 week ago but 2 days ago she noticed the entire toe has turned black. Patient follows with vascular surgery; had an AYLA done recently which reveal b/l superficial femoral and distal trifurcation disease (Daughter brought the result with her). As per daughter, vascular has not intention of any intervention because of his comorbidities. No fever, chills, chest pain, shortness of breath, nausea/vomiting, abdominal pain, change in bowel/urinary habit reported.     daughter Katelynn at bedside provided hx. and shared photos of toes with me.  Toe had an initial bruise, which progressively got darker and black.     =======================================================  Past Medical & Surgical Hx:  =====================  PAST MEDICAL & SURGICAL HISTORY:  CVA (cerebral vascular accident)  PVD (peripheral vascular disease)  Hypertension, unspecified type  CAD (coronary artery disease): CVA  No significant past surgical history      Problem List:  ==========  HEALTH ISSUES - PROBLEM Dx:       Social Hx:  =======  no toxic habits currently    FAMILY HISTORY:  Family history of breast cancer in mother  no significant family history of immunosuppressive disorders in mother or father   =======================================================  REVIEW OF SYSTEMS:  as above  all other ROS negative    =======================================================  Allergies  No Known Allergies    Antibiotics:  none    Other medications:  amLODIPine   Tablet 5 milliGRAM(s) Oral daily  heparin  Injectable 5000 Unit(s) SubCutaneous every 8 hours  povidone iodine 10% Solution 1 Application(s) Topical daily  saccharomyces boulardii 250 milliGRAM(s) Oral two times a day  sodium chloride 0.9%. 1000 milliLiter(s) IV Continuous <Continuous>     piperacillin/tazobactam IVPB.   25 mL/Hr IV Intermittent (04-30-19 @ 12:28)    piperacillin/tazobactam IVPB.   200 mL/Hr IV Intermittent (04-30-19 @ 02:22)    vancomycin  IVPB   250 mL/Hr IV Intermittent (04-30-19 @ 04:58)      ======================================================  Physical Exam:  ============  T(F): 98.4 (30 Apr 2019 02:42), Max: 98.4 (30 Apr 2019 02:42)  HR: 54 (30 Apr 2019 07:45)  BP: 137/67 (30 Apr 2019 07:45)  RR: 18 (30 Apr 2019 02:42)  SpO2: 95% (30 Apr 2019 02:42) (94% - 99%)  Height (cm): 175.26 (04-30-19 @ 02:42)  Weight (kg): 59.2 (04-30-19 @ 02:42)  BMI (kg/m2): 19.3 (04-30-19 @ 02:42)  BSA (m2): 1.72 (04-30-19 @ 02:42)    General:  No acute distress.  THIN FRAIL ELDERLY CONTRACTED  Eye: Pupils are equal, round and reactive to light, Extraocular movements are intact, Normal conjunctiva.  HENT: Normocephalic, Oral mucosa is moist,   Neck: Supple, No lymphadenopathy.  Respiratory: Lungs WITH GOOD AIR ENTRY  Cardiovascular: Normal rate, Regular rhythm, No edema.  Gastrointestinal: Soft, Non-tender, Non-distended, Normal bowel sounds.  Genitourinary: No POLLOCK  Lymphatics: No lymphadenopathy neck,   Musculoskeletal: CONTRACTED EXTREMITIES    Integumentary: No rash.; Left lateral malleolus ulcer, slight slough in base.   RIGHT #4 TOE WITH DRY GANGRENE.  Neurologic:  AWAKE, NON VERBAL      =======================================================  Labs:                        10.3   6.7   )-----------( 109      ( 29 Apr 2019 23:37 )             31.9       WBC Count: 6.7 K/uL (04-29-19 @ 23:37)      04-30    141  |  102  |  76.0<H>  ----------------------------<  92  4.8   |  23.0  |  2.34<H>    Ca    9.4      30 Apr 2019 07:09    TPro  6.3<L>  /  Alb  3.6  /  TBili  0.4  /  DBili  x   /  AST  23  /  ALT  20  /  AlkPhos  47  04-29

## 2019-04-30 NOTE — CONSULT NOTE ADULT - ASSESSMENT
88yo M S/P CVA, aphasic with R sided weakness Lower extremity contracted  PVD with rapid spreading of 4th toe gangrene    PVD  R Foot 4th toe gangrene  R Lower extremity contracted flexed to his torso.  Vascular aware, do not intend to treat surgically    Acute/Chronic Pain  Vascular compromise to R foot  Gangrene sensitive  Will start of Fentanyl 12 mcg/hr patch today  Ofirmev infusion ASAP  Tylenol 650 Q6 ATC to follow  Amitriptylene-10 mg HS  Liddoerm patch to lower dorsum of R foot    Goals of Care  Daughter will be back at his bedside at 1600 hrs today  Will meet with her discuss, how and where she wants her father to be cared for  Discuss Advance Directives and MOLST  Stress importance of good pain managment 86yo M S/P CVA, aphasic with R sided weakness R Lower extremity contracted  PVD with rapid spreading of 4th toe gangrene    PVD  R Foot 4th toe gangrene  R Lower extremity contracted flexed to his torso.  Vascular aware, do not intend to treat surgically  LLL is more contracted when he is awake and restless.    Acute/Chronic Pain  Vascular compromise to R foot  Gangrene sensitive not able to describe or advocate for himself.  Started Fentanyl 12 mcg/hr patch today, then DC'd at Patient's daughters request. She does not want him treated with any opioids. 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 She doesn't believe he has pain unless you touch his foot or toe. She asked that Oxycodone be discontinued as well.  Multimodal approach   Ofirmev infusion ASAP  Tylenol 650 Q6 ATC to follow  Amitriptylene-10 mg HS  Lidoderm patch to lower dorsum of R foot  Would consider Baclofen 5mg Q6 PO  prn for spasms/pain if something else is needed to treat refractory pain    Goals of Care  Daughter will be back at his bedside at 1600 hrs today  Will meet with her discuss, how and where she wants her father to be cared for Intends to bring him back home.  Still undecided about conservative vs surgical treatment by podiatry    Discussed Advance Directives and MOLST-  I Tried to begin a conversation regarding MOLST Directive, she immediately said they have that taken care of-referring to legal documents and Living will.  I gave her a copy, told her the importance of establishing "Medical orders" for travel and to ensure a natural death if that is his wish.  Stressed importance of good pain management

## 2019-04-30 NOTE — CONSULT NOTE ADULT - ATTENDING COMMENTS
COUNSELING: Meeting with daughter this afternoon    Face to face meeting to discuss Advanced Care Planning - Time Spent ______ Minutes.  See goals of care note.    More than 50% time spent in counseling and coordinating care. ______ Minutes.     Thank you for the opportunity to assist with the care of this patient.   [facility] [phone]. COUNSELING: Meeting with daughter this afternoon    Face to face meeting to discuss Advanced Care Planning - Time Spent _35_____ Minutes.  See goals of care note.    More than 50% time spent in counseling and coordinating care. __15____ Minutes.     Thank you for the opportunity to assist with the care of this patient.   [facility] [phone].

## 2019-04-30 NOTE — CONSULT NOTE ADULT - CONSULT REASON
88 yo M  with Gangrenous toe  Pain Mgmt
PAD, Right 4th toe gangrene
gangrene of 4th toe
Right toe gangrene

## 2019-04-30 NOTE — DIETITIAN INITIAL EVALUATION ADULT. - PHYSICAL APPEARANCE
emaciated/BMI 17.9 calculated with RD bedscale 121lbs; NFPE conducted, physical findings include severe muscle wasting (temples, clavicles, shoulders, scapula) severe fat loss (buccal, orbital, thoracic region); L ankle unstageable, Stage II sacrum, Stage II R. midback/contracted

## 2019-04-30 NOTE — H&P ADULT - CONSTITUTIONAL DETAILS
well-nourished/well-developed/well-groomed/no distress well-developed/well-groomed/no distress/cachectic

## 2019-05-01 ENCOUNTER — TRANSCRIPTION ENCOUNTER (OUTPATIENT)
Age: 84
End: 2019-05-01

## 2019-05-01 VITALS
OXYGEN SATURATION: 96 % | RESPIRATION RATE: 18 BRPM | DIASTOLIC BLOOD PRESSURE: 91 MMHG | TEMPERATURE: 97 F | SYSTOLIC BLOOD PRESSURE: 172 MMHG | HEART RATE: 70 BPM

## 2019-05-01 LAB
ANION GAP SERPL CALC-SCNC: 14 MMOL/L — SIGNIFICANT CHANGE UP (ref 5–17)
BUN SERPL-MCNC: 85 MG/DL — HIGH (ref 8–20)
CALCIUM SERPL-MCNC: 9.5 MG/DL — SIGNIFICANT CHANGE UP (ref 8.6–10.2)
CHLORIDE SERPL-SCNC: 100 MMOL/L — SIGNIFICANT CHANGE UP (ref 98–107)
CO2 SERPL-SCNC: 25 MMOL/L — SIGNIFICANT CHANGE UP (ref 22–29)
CREAT SERPL-MCNC: 3.08 MG/DL — HIGH (ref 0.5–1.3)
GLUCOSE SERPL-MCNC: 72 MG/DL — SIGNIFICANT CHANGE UP (ref 70–115)
POTASSIUM SERPL-MCNC: 4.9 MMOL/L — SIGNIFICANT CHANGE UP (ref 3.5–5.3)
POTASSIUM SERPL-SCNC: 4.9 MMOL/L — SIGNIFICANT CHANGE UP (ref 3.5–5.3)
SODIUM SERPL-SCNC: 139 MMOL/L — SIGNIFICANT CHANGE UP (ref 135–145)

## 2019-05-01 PROCEDURE — 86850 RBC ANTIBODY SCREEN: CPT

## 2019-05-01 PROCEDURE — 86900 BLOOD TYPING SEROLOGIC ABO: CPT

## 2019-05-01 PROCEDURE — 99239 HOSP IP/OBS DSCHRG MGMT >30: CPT

## 2019-05-01 PROCEDURE — 73630 X-RAY EXAM OF FOOT: CPT

## 2019-05-01 PROCEDURE — 99285 EMERGENCY DEPT VISIT HI MDM: CPT | Mod: 25

## 2019-05-01 PROCEDURE — 85730 THROMBOPLASTIN TIME PARTIAL: CPT

## 2019-05-01 PROCEDURE — 86901 BLOOD TYPING SEROLOGIC RH(D): CPT

## 2019-05-01 PROCEDURE — 80053 COMPREHEN METABOLIC PANEL: CPT

## 2019-05-01 PROCEDURE — 80048 BASIC METABOLIC PNL TOTAL CA: CPT

## 2019-05-01 PROCEDURE — 85027 COMPLETE CBC AUTOMATED: CPT

## 2019-05-01 PROCEDURE — 83605 ASSAY OF LACTIC ACID: CPT

## 2019-05-01 PROCEDURE — 85652 RBC SED RATE AUTOMATED: CPT

## 2019-05-01 PROCEDURE — 87040 BLOOD CULTURE FOR BACTERIA: CPT

## 2019-05-01 PROCEDURE — 99232 SBSQ HOSP IP/OBS MODERATE 35: CPT

## 2019-05-01 PROCEDURE — 36415 COLL VENOUS BLD VENIPUNCTURE: CPT

## 2019-05-01 PROCEDURE — 86140 C-REACTIVE PROTEIN: CPT

## 2019-05-01 PROCEDURE — 85610 PROTHROMBIN TIME: CPT

## 2019-05-01 PROCEDURE — 96360 HYDRATION IV INFUSION INIT: CPT

## 2019-05-01 RX ORDER — ASCORBIC ACID 60 MG
500 TABLET,CHEWABLE ORAL DAILY
Qty: 0 | Refills: 0 | Status: DISCONTINUED | OUTPATIENT
Start: 2019-05-01 | End: 2019-05-01

## 2019-05-01 RX ORDER — LIDOCAINE 4 G/100G
1 CREAM TOPICAL
Qty: 15 | Refills: 0 | OUTPATIENT
Start: 2019-05-01 | End: 2019-05-15

## 2019-05-01 RX ORDER — CADEXOMER IODINE 0.9 %
1 PADS, MEDICATED (EA) TOPICAL
Qty: 30 | Refills: 0 | OUTPATIENT
Start: 2019-05-01 | End: 2019-05-30

## 2019-05-01 RX ORDER — ZINC SULFATE TAB 220 MG (50 MG ZINC EQUIVALENT) 220 (50 ZN) MG
220 TAB ORAL DAILY
Qty: 0 | Refills: 0 | Status: DISCONTINUED | OUTPATIENT
Start: 2019-05-01 | End: 2019-05-01

## 2019-05-01 RX ORDER — AMLODIPINE BESYLATE 2.5 MG/1
1 TABLET ORAL
Qty: 15 | Refills: 0 | OUTPATIENT
Start: 2019-05-01 | End: 2019-05-15

## 2019-05-01 RX ORDER — POVIDONE-IODINE 5 %
1 AEROSOL (ML) TOPICAL
Qty: 30 | Refills: 0 | OUTPATIENT
Start: 2019-05-01 | End: 2019-05-30

## 2019-05-01 RX ORDER — LOSARTAN POTASSIUM 100 MG/1
1 TABLET, FILM COATED ORAL
Qty: 0 | Refills: 0 | COMMUNITY

## 2019-05-01 RX ORDER — ACETAMINOPHEN 500 MG
2 TABLET ORAL
Qty: 0 | Refills: 0 | COMMUNITY
Start: 2019-05-01

## 2019-05-01 RX ORDER — ASCORBIC ACID 60 MG
1 TABLET,CHEWABLE ORAL
Qty: 15 | Refills: 0 | OUTPATIENT
Start: 2019-05-01 | End: 2019-05-15

## 2019-05-01 RX ORDER — ZINC SULFATE TAB 220 MG (50 MG ZINC EQUIVALENT) 220 (50 ZN) MG
1 TAB ORAL
Qty: 10 | Refills: 0 | OUTPATIENT
Start: 2019-05-01 | End: 2019-05-10

## 2019-05-01 RX ADMIN — HEPARIN SODIUM 5000 UNIT(S): 5000 INJECTION INTRAVENOUS; SUBCUTANEOUS at 12:42

## 2019-05-01 RX ADMIN — Medication 500 MILLIGRAM(S): at 12:41

## 2019-05-01 RX ADMIN — Medication 650 MILLIGRAM(S): at 12:39

## 2019-05-01 RX ADMIN — Medication 1 APPLICATION(S): at 12:41

## 2019-05-01 RX ADMIN — Medication 650 MILLIGRAM(S): at 12:50

## 2019-05-01 RX ADMIN — Medication 650 MILLIGRAM(S): at 18:04

## 2019-05-01 RX ADMIN — Medication 1 APPLICATION(S): at 12:50

## 2019-05-01 RX ADMIN — ZINC SULFATE TAB 220 MG (50 MG ZINC EQUIVALENT) 220 MILLIGRAM(S): 220 (50 ZN) TAB at 18:02

## 2019-05-01 RX ADMIN — Medication 1 TABLET(S): at 12:42

## 2019-05-01 RX ADMIN — Medication 650 MILLIGRAM(S): at 00:15

## 2019-05-01 RX ADMIN — LIDOCAINE 1 PATCH: 4 CREAM TOPICAL at 05:46

## 2019-05-01 RX ADMIN — LIDOCAINE 1 PATCH: 4 CREAM TOPICAL at 12:41

## 2019-05-01 RX ADMIN — Medication 250 MILLIGRAM(S): at 05:58

## 2019-05-01 RX ADMIN — Medication 650 MILLIGRAM(S): at 05:59

## 2019-05-01 RX ADMIN — AMLODIPINE BESYLATE 5 MILLIGRAM(S): 2.5 TABLET ORAL at 05:58

## 2019-05-01 NOTE — DISCHARGE NOTE NURSING/CASE MANAGEMENT/SOCIAL WORK - NSDCDPATPORTLINK_GEN_ALL_CORE
You can access the [employer] [facility] Patient Portal, offered by [facility], by registering with the following website: [email or web address]

## 2019-05-01 NOTE — DISCHARGE NOTE PROVIDER - NSDCCPCAREPLAN_GEN_ALL_CORE_FT
PRINCIPAL DISCHARGE DIAGNOSIS  Diagnosis: Gangrene of toe of right foot  Assessment and Plan of Treatment: seen by vascular, id and podiatry, and mentioned high risk candidate for surgery  podiatry recommended outpatient follow up         SECONDARY DISCHARGE DIAGNOSES  Diagnosis: Hypertension  Assessment and Plan of Treatment: losartan changed to amlodpine given CKD history  follow up with pcp for hypertension    Diagnosis: Chronic kidney disease (CKD)  Assessment and Plan of Treatment: ckd stage 4  f/u outpatient with pcp and neprolgy as an outpatient

## 2019-05-01 NOTE — DISCHARGE NOTE NURSING/CASE MANAGEMENT/SOCIAL WORK - NSDCFUADDAPPT_GEN_ALL_CORE_FT
Outpatient Wound Care:  1: remove dressing and cleanse wounds with saline  2: place iodosorb paste on left lateral malleolus wound and cover with allevyn pad  3: Right foot gangrenous toe does not require dressing; keep dry  4: Dressing to be changed every other day Outpatient Wound Care:  1: remove dressing and cleanse wounds with saline  2: place iodosorb paste on left lateral malleolus wound and cover with allevyn pad  3: Right foot gangrenous toe does not require dressing; keep dry  4: Dressing to be changed every other day      Ambulance  6pm today Daughter going with pt.

## 2019-05-01 NOTE — CHART NOTE - NSCHARTNOTEFT_GEN_A_CORE
Due to patient's stroke, he will require a standard wheel chair. The patient is significantly limited in his ability to complete MRADLs such as dressing and toileting in a reasonable time frame. Patient's mobility limitation can not be resolved with the use of cane or walker. The patient's home provides adequate access to maneuver the manual wheel chair and he has a care giver who is willing and able to assist. The manual wheelchair will significantly improved the patient's ability to participate in MRADL'S and will use it regularly. He had not expressed unwillingness to the manual wheel chair at home.     Patient can not safely transfer from bed to commode/chair and will require a Michelle lift. with out the Michelle lift, the patient will be confined to bed and risk of pressure ulcer.

## 2019-05-01 NOTE — DISCHARGE NOTE PROVIDER - NSDCFUADDAPPT_GEN_ALL_CORE_FT
Outpatient Wound Care:  1: remove dressing and cleanse wounds with saline  2: place iodosorb paste on left lateral malleolus wound and cover with allevyn pad  3: Right foot gangrenous toe does not require dressing; keep dry  4: Dressing to be changed every other day

## 2019-05-01 NOTE — DISCHARGE NOTE PROVIDER - CARE PROVIDER_API CALL
Hawk Gracia (DPNANETTE)  Surgery  [address] [place] [address]  Phone: [phone]  Fax: [phone]  Follow Up Time:     Radha Jones)  Surgery; Vascular Surgery  [address] [place] [address]  Phone: [phone]  Fax: [phone]  Follow Up Time:

## 2019-05-01 NOTE — PROGRESS NOTE ADULT - SUBJECTIVE AND OBJECTIVE BOX
S: 88 y/o male with PMH of CAD s/p CABG, HTN, PVD, CVA with aphasia and contracted limbs sent in by daughter complaining of his black right 4th toe. Pt is a poor historian. As per daughter, she stated noticing black dot on the side of the 4th toe 1 week ago but 2 days ago she noticed the entire toe has turned black. Patient follows with vascular surgery; had an AYLA done recently which reveal b/l superficial femoral and distal trifurcation disease (Daughter brought the result with her). As per daughter, vascular has not intention of any intervention because of his comorbidities. No fever, chills, chest pain, shortness of breath, nausea/vomiting, abdominal pain, change in bowel/urinary habit reported.    Patient admits to  (-) Fevers, (-) Chills, (-) Nausea, (-) Vomiting, (-) Shortness of Breath    PMH: CVA (cerebral vascular accident)  PVD (peripheral vascular disease)  Hypertension, unspecified type  CAD (coronary artery disease)  No pertinent past medical history    PSH: No significant past surgical history    Allergies: No Known Allergies    Labs:                          10.3   6.7   )-----------( 109      ( 29 Apr 2019 23:37 )             31.9     WBC Trend  6.7 Date (04-29 @ 23:37)      Chem  04-30    141  |  102  |  76.0<H>  ----------------------------<  92  4.8   |  23.0  |  2.34<H>    Ca    9.4      30 Apr 2019 07:09    TPro  6.3<L>  /  Alb  3.6  /  TBili  0.4  /  DBili  x   /  AST  23  /  ALT  20  /  AlkPhos  47  04-29    T(F): 98.4 (04-30-19 @ 02:42), Max: 98.4 (04-30-19 @ 02:42)  HR: 54 (04-30-19 @ 07:45) (54 - 126)  BP: 137/67 (04-30-19 @ 07:45) (131/72 - 158/75)  RR: 18 (04-30-19 @ 02:42) (15 - 18)  SpO2: 95% (04-30-19 @ 02:42) (94% - 99%)    O:   General: Pleasant  male NAD & AOX3.    Integument:  Skin warm, dry and supple bilateral.    Ulceration Left lateral malleolus with fibrogranular base, pressure related in nature, no malodor or probe to bone, measuring 2 x 1.5 x 0.1 cm  Right 4th digit dry and gangrene to the entire digit with no malodor  Vascular: Dorsalis Pedis and Posterior Tibial pulses 1/4.  Capillary re-fill time less then 3 seconds digits 1-5 bilateral.    Neuro: Protective sensation diminished to the level of the digits bilateral.  MSK: Contracted lower extremities    < from: Xray Foot AP + Lateral + Oblique, Right (04.29.19 @ 22:52) >   EXAM:  FOOT-RIGHT                          PROCEDURE DATE:  04/29/2019          INTERPRETATION:  RIGHT foot     CLINICAL INFORMATION:Injury with  Pain. Attention RIGHT fourth toe    TECHNIQUE: AP,lateral and oblique views.    FINDINGS:  Vascular arterial calcifications noted.  The bones and joint spaces are preserved.  There are no fractures or dislocations.  The soft tissues are normal.     IMPRESSION:    No acute radiographic osseous pathology.  If pain persist despite conservative therapy and soft tissue internal   derangement or occult fracture is clinically suspected follow-up MRI   recommended.    < end of copied text >    A: Right 4th digit gangrene and left lateral malleolus ulcer    P:   Chart reviewed and Patient evaluated  Discussed diagnosis and treatment with patient  Iodosorb to be ordered to be applied to the left lateral malleolus with an Allevyn pad  Right 4th digit to be kept dry with light application of betadine and DSD.  X-rays reviewed : Shows acute pathology.  Continue with IV antibiotics As Per ID  Outpatient AYLA reviewed. AYLA 0.4 - R, 0.6 - L.   Weight bearing as tolerated.  Consider surgical management versus conservative management with autoamputation d/t comorbidities  Vascular surgery note appreciated. States that patient is not a candidate for re-vascularization d/t non-ambulatory status, severe knee contracture and medical co-morbidities.  Offloading to bilateral Heels in bed with offloading boots.    Podiatry will follow while in house.  Patient does not require acute care by podiatry- patient may be seen outpatient for lower extremity wounds  Discussed with Attending Gene.    Outpatient Wound Care:  1: remove dressing and cleanse wounds with saline  2: place iodosorb paste on left lateral malleolus wound and cover with allevyn pad  3: Right foot gangrenous toe does not require dressing; keep dry  4: Dressing to be changed every other day
FOLLOW UP VISIT, ongoing goc and sx mgmt    INTERVAL HPI/OVERNIGHT EVENTS:  Pt seen and examined at bedside. Daughter Katelynn and private  present. Pt being cleaned by nurse aide. He appears comfortable, no obvious grimacing or restlessness. Responds to verbal stimuli with 1-2 word responses.      Unable to perform ROS due to dementia    MEDICATIONS  (STANDING):  acetaminophen   Tablet .. 650 milliGRAM(s) Oral every 6 hours  amitriptyline 10 milliGRAM(s) Oral at bedtime  amLODIPine   Tablet 5 milliGRAM(s) Oral daily  ascorbic acid 500 milliGRAM(s) Oral daily  cadexomer iodine 0.9% Gel 1 Application(s) Topical daily  heparin  Injectable 5000 Unit(s) SubCutaneous every 8 hours  lidocaine   Patch 1 Patch Transdermal daily  multivitamin 1 Tablet(s) Oral daily  povidone iodine 10% Solution 1 Application(s) Topical daily  sodium chloride 0.9%. 1000 milliLiter(s) (84 mL/Hr) IV Continuous <Continuous>  zinc sulfate 220 milliGRAM(s) Oral daily    MEDICATIONS  (PRN):  acetaminophen   Tablet .. 650 milliGRAM(s) Oral every 6 hours PRN Temp greater or equal to 38C (100.4F), Mild Pain (1 - 3), Moderate Pain (4 - 6)      PHYSICAL EXAM:    Vital Signs Last 24 Hrs  T(C): 36.3 (01 May 2019 07:38), Max: 36.4 (30 Apr 2019 23:45)  T(F): 97.4 (01 May 2019 07:38), Max: 97.5 (30 Apr 2019 23:45)  HR: 70 (01 May 2019 07:38) (70 - 70)  BP: 172/91 (01 May 2019 07:38) (170/85 - 172/91)  BP(mean): --  RR: 18 (01 May 2019 07:38) (18 - 18)  SpO2: 96% (01 May 2019 07:38) (96% - 98%)    General: elderly. cachetic. No acute distress.    HEENT: MMM. Bitemporal wasting  Lungs: clear to auscultation bilaterally. non-labored.   CV: +s1/s2. Regular rate and rhythm.     GI: +bowel sound. abdomen soft, non-tender, non-distended. +BM today  MSK: Contracted extremities,   Neuro: Wakes to verbal stimuli. Confused. Unable to engage in any meaningful conversation.   Skin: warm and dry. +dry gangrene at 4th Rt toe      LABS:                          10.3   6.7   )-----------( 109      ( 29 Apr 2019 23:37 )             31.9     05-01    139  |  100  |  85.0<H>  ----------------------------<  72  4.9   |  25.0  |  3.08<H>    Ca    9.5      01 May 2019 08:19    TPro  6.3<L>  /  Alb  3.6  /  TBili  0.4  /  DBili  x   /  AST  23  /  ALT  20  /  AlkPhos  47  04-29    PT/INR - ( 29 Apr 2019 23:37 )   PT: 13.4 sec;   INR: 1.16 ratio         PTT - ( 29 Apr 2019 23:37 )  PTT:32.6 sec    I&O's Summary    30 Apr 2019 07:01  -  01 May 2019 07:00  --------------------------------------------------------  IN: 756 mL / OUT: 0 mL / NET: 756 mL    RADIOLOGY & ADDITIONAL STUDIES: Reviewed, no new recent studies    ADVANCE DIRECTIVES:   DNR YES NO  Completed on:                     MOLST  YES NO   Completed on:  Living Will  YES NO   Completed on: daughter to bring in copy for our records

## 2019-05-01 NOTE — PROGRESS NOTE ADULT - ASSESSMENT
Mr. 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 Palliative following for goc and pain management.    PLAN    PVD  - Rt 4th toe gangrene  - seen by vascular and podiatry, poor candidate for revascularization, surgical (AKA) vs conservative autoamputation  - c/w daily wound care per podiatry    Acute on Chronic Pain  Dtr adamently against use of opioids and adjuvants/antidepressants. Pt with reported history of opioid and alcohol abuse. Daughter feels his pain is controlled, only desires acetaminophen despite recommendations of adjuvant agent with amitriptyline for neuropathic pain.   - c/w  Tylenol 650 Q6 ATC, lidoderm patch  - will d/c amitriptyline as dtr refuses med  - daughter request all meds to be discussed with her prior to adding to regimen    Goals of Care Mr. 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 Palliative following for goc and pain management.    PLAN    PVD  - Rt 4th toe gangrene  - seen by vascular and podiatry, poor candidate for revascularization, surgical (AKA) vs conservative autoamputation  - c/w daily wound care per podiatry    Acute on Chronic Pain  Dtr adamently against use of opioids and adjuvants/antidepressants. Pt with reported history of opioid and alcohol abuse. Daughter feels his pain is controlled, only desires acetaminophen despite recommendations of adjuvant agent with amitriptyline for neuropathic pain. He does appear to be resting comfortably during my visit.  - c/w  Tylenol 650 Q6 ATC, lidoderm patch  - will d/c amitriptyline as dtr refuses med  - daughter request all meds to be discussed with her prior to adding to regimen    H/O CVA/Suspect Component of Underlying Dementia  - c/w supportive care    Palliative Care Encounter  Spoke with dtr Katelynn, she continues to be reluctant to any adjustments in pain regimen due to reported history with ?opioid and alcohol abuse; history of suicidal attempt in past ~3 yrs ago. Dtr shared complicated relationship with pt and his spouse, claims wife tried to overdose him with Xanax in the past despite instructions by psychiatrist. Pt has been living with Katelynn for past 1 year and has daily private /aide. Pt is bedbound and dependent for all ADLs at baseline since CVA. She appears to be leaning towards conservative management of pt's gangrene with auto amputation, express desire to take him home and resume home care. I do feel pt would be a good candidate for home with hospice services given h/o CVA and PVD however do not feel dtr would be amendable at this time. Will continue to follow along for ongoing goc and sx management.     Advance Directives  Dtr brought up there is a living will at home, attempted to engage to discuss previous wishes by patient but she became guarded. Katelynn agreeable to bring in a copy of both HCP and living will for our records.

## 2019-05-01 NOTE — DISCHARGE NOTE PROVIDER - HOSPITAL COURSE
88 y/o male with PMH of CAD s/p CABG, HTN, PVD, CVA with aphasia and contracted limbs, CKD stahe 4 was brought to the ED by daughter complaining of black right 4th toe since 2-3 days. Podiatry, vascualar and ID consulted. As per vascular patient is not a candidate for revascularization. Right 4th toe amputation as per podiatry and may need full TMA if amp site does not heal. At risk for limb loss if above measures fail. As per podiatry Patient does not require acute care by podiatry- patient may be seen outpatient for lower extremity wounds and very high risk for sugrery. ID mentioned doesn't look infected, antibiotics discontinued. Patient is medically ready for discharge and follow up with podiatry and vascular as an outpatient..            General:  No acute distress.  THIN FRAIL ELDERLY CONTRACTED    Eye: Pupils are equal, round and reactive to light, Extraocular movements are intact, Normal conjunctiva.    HENT: Normocephalic, Oral mucosa is moist,     Neck: Supple, No lymphadenopathy.    Respiratory: Lungs WITH GOOD AIR ENTRY    Cardiovascular: Normal rate, Regular rhythm, No edema.    Gastrointestinal: Soft, Non-tender, Non-distended, Normal bowel sounds.    Genitourinary: No POLLOCK    Lymphatics: No lymphadenopathy neck,     Musculoskeletal: CONTRACTED EXTREMITIES      Integumentary: No rash.; Left lateral malleolus ulcer, slight slough in base.     RIGHT #4 TOE WITH DRY GANGRENE.    Neurologic:  AWAKE, NON VERBAL                Outpatient Wound Care:    1: remove dressing and cleanse wounds with saline    2: place iodosorb paste on left lateral malleolus wound and cover with allevyn pad    3: Right foot gangrenous toe does not require dressing; keep dry    4: Dressing to be changed every other day        35 min spent in discharging the patient.

## 2019-05-05 LAB
CULTURE RESULTS: SIGNIFICANT CHANGE UP
CULTURE RESULTS: SIGNIFICANT CHANGE UP
SPECIMEN SOURCE: SIGNIFICANT CHANGE UP
SPECIMEN SOURCE: SIGNIFICANT CHANGE UP

## 2021-05-18 NOTE — PATIENT PROFILE ADULT - FALL HARM RISK
Include Location In Plan?: Yes Detail Level: Generalized Hide Additional Notes?: No age(85 years old or older)

## 2022-08-14 NOTE — PATIENT PROFILE ADULT - LAST BOWEL MOVEMENT DATE
Problem: Adult Inpatient Plan of Care  Goal: Plan of Care Review  Outcome: Ongoing, Progressing  Goal: Patient-Specific Goal (Individualized)  Outcome: Ongoing, Progressing     Problem: Skin Injury Risk Increased  Goal: Skin Health and Integrity  Outcome: Ongoing, Progressing  Intervention: Optimize Skin Protection  Flowsheets (Taken 8/14/2022 5295)  Pressure Reduction Techniques:   frequent weight shift encouraged   weight shift assistance provided  Head of Bed (HOB) Positioning: HOB lowered     Problem: Diabetes Comorbidity  Goal: Blood Glucose Level Within Targeted Range  Outcome: Ongoing, Progressing  Intervention: Monitor and Manage Glycemia  Flowsheets (Taken 8/14/2022 6990)  Glycemic Management: blood glucose monitored      29-Apr-2019

## 2023-05-09 NOTE — PATIENT PROFILE ADULT - OVER THE PAST TWO WEEKS, HAVE YOU FELT LITTLE INTEREST OR PLEASURE IN DOING THINGS?
Pt is A&Ox4, 2L O2. Guaze and tegaderm placed from cardiac procedure. Ice pack and norco given for pain. Plan for scheduled  HD. Call light within reach. Problem: Patient Centered Care  Goal: Patient preferences are identified and integrated in the patient's plan of care  Description: Interventions:  - What would you like us to know as we care for you?   - Provide timely, complete, and accurate information to patient/family  - Incorporate patient and family knowledge, values, beliefs, and cultural backgrounds into the planning and delivery of care  - Encourage patient/family to participate in care and decision-making at the level they choose  - Honor patient and family perspectives and choices  5/9/2023 0800 by Nataly Ceballos RN  Outcome: Progressing  5/9/2023 0336 by Nataly Ceballos RN  Outcome: Progressing     Problem: CARDIOVASCULAR - ADULT  Goal: Maintains optimal cardiac output and hemodynamic stability  Description: INTERVENTIONS:  - Monitor vital signs, rhythm, and trends  - Monitor for bleeding, hypotension and signs of decreased cardiac output  - Evaluate effectiveness of vasoactive medications to optimize hemodynamic stability  - Monitor arterial and/or venous puncture sites for bleeding and/or hematoma  - Assess quality of pulses, skin color and temperature  - Assess for signs of decreased coronary artery perfusion - ex.  Angina  - Evaluate fluid balance, assess for edema, trend weights  5/9/2023 0800 by Nataly Ceballos RN  Outcome: Progressing  5/9/2023 0336 by Nataly Ceballos RN  Outcome: Progressing  Goal: Absence of cardiac arrhythmias or at baseline  Description: INTERVENTIONS:  - Continuous cardiac monitoring, monitor vital signs, obtain 12 lead EKG if indicated  - Evaluate effectiveness of antiarrhythmic and heart rate control medications as ordered  - Initiate emergency measures for life threatening arrhythmias  - Monitor electrolytes and administer replacement therapy as ordered  5/9/2023 0800 by Dung Winkler RN  Outcome: Progressing  5/9/2023 0336 by Dung Winkler RN  Outcome: Progressing     Problem: RESPIRATORY - ADULT  Goal: Achieves optimal ventilation and oxygenation  Description: INTERVENTIONS:  - Assess for changes in respiratory status  - Assess for changes in mentation and behavior  - Position to facilitate oxygenation and minimize respiratory effort  - Oxygen supplementation based on oxygen saturation or ABGs  - Provide Smoking Cessation handout, if applicable  - Encourage broncho-pulmonary hygiene including cough, deep breathe, Incentive Spirometry  - Assess the need for suctioning and perform as needed  - Assess and instruct to report SOB or any respiratory difficulty  - Respiratory Therapy support as indicated  - Manage/alleviate anxiety  - Monitor for signs/symptoms of CO2 retention  5/9/2023 0800 by Dung Winkler RN  Outcome: Progressing  5/9/2023 0336 by Dung Winkler RN  Outcome: Progressing     Problem: PAIN - ADULT  Goal: Verbalizes/displays adequate comfort level or patient's stated pain goal  Description: INTERVENTIONS:  - Encourage pt to monitor pain and request assistance  - Assess pain using appropriate pain scale  - Administer analgesics based on type and severity of pain and evaluate response  - Implement non-pharmacological measures as appropriate and evaluate response  - Consider cultural and social influences on pain and pain management  - Manage/alleviate anxiety  - Utilize distraction and/or relaxation techniques  - Monitor for opioid side effects  - Notify MD/LIP if interventions unsuccessful or patient reports new pain  - Anticipate increased pain with activity and pre-medicate as appropriate  5/9/2023 0800 by Dung Winkler RN  Outcome: Progressing  5/9/2023 0336 by Dung Winkler RN  Outcome: Progressing     Problem: Patient/Family Goals  Goal: Patient/Family Long Term Goal  Description: Patient's Long Term Goal: Interventions:  -   - See additional Care Plan goals for specific interventions  5/9/2023 0800 by Janna Arizmendi RN  Outcome: Progressing  5/9/2023 0336 by Janna Arizmendi RN  Outcome: Progressing  Goal: Patient/Family Short Term Goal  Description: Patient's Short Term Goal:     Interventions:   -   - See additional Care Plan goals for specific interventions  5/9/2023 0800 by Janna Arizmendi RN  Outcome: Progressing  5/9/2023 0336 by Janna Arizmendi RN  Outcome: Progressing no

## 2024-03-27 NOTE — ED ADULT NURSE NOTE - CHIEF COMPLAINT QUOTE
Patient awake, negative obvious distress or discomfort, mostly nonverbal as per daughter at bedside. Daughter stated called 911 because patient is being treated for gangrene to right foot, 4th toe & "needs to be checked out." [facility] Prior Authorization Team   Phone: [phone]    PA Initiation    Medication: PHENTERMINE HCL 30 MG PO CAPS  Insurance Company: [employer] - Phone [phone] Fax [phone]  Pharmacy Filling the Rx: [facility] - [id number]  Filling Pharmacy Phone: [phone]  Filling Pharmacy Fax:    Start Date: 3/27/2024